# Patient Record
Sex: MALE | Race: WHITE | NOT HISPANIC OR LATINO | Employment: FULL TIME | URBAN - METROPOLITAN AREA
[De-identification: names, ages, dates, MRNs, and addresses within clinical notes are randomized per-mention and may not be internally consistent; named-entity substitution may affect disease eponyms.]

---

## 2023-11-12 ENCOUNTER — OFFICE VISIT (OUTPATIENT)
Dept: URGENT CARE | Facility: URGENT CARE | Age: 48
End: 2023-11-12
Payer: COMMERCIAL

## 2023-11-12 VITALS
DIASTOLIC BLOOD PRESSURE: 94 MMHG | TEMPERATURE: 97.1 F | RESPIRATION RATE: 15 BRPM | OXYGEN SATURATION: 99 % | WEIGHT: 184.8 LBS | HEART RATE: 61 BPM | SYSTOLIC BLOOD PRESSURE: 132 MMHG

## 2023-11-12 DIAGNOSIS — H57.89 REDNESS OF LEFT EYE: Primary | ICD-10-CM

## 2023-11-12 PROCEDURE — 99213 OFFICE O/P EST LOW 20 MIN: CPT | Performed by: FAMILY MEDICINE

## 2023-11-12 RX ORDER — POLYMYXIN B SULFATE AND TRIMETHOPRIM 1; 10000 MG/ML; [USP'U]/ML
1-2 SOLUTION OPHTHALMIC EVERY 4 HOURS
Qty: 10 ML | Refills: 0 | Status: SHIPPED | OUTPATIENT
Start: 2023-11-12 | End: 2024-04-19

## 2023-11-12 NOTE — PROGRESS NOTES
Assessment & Plan     Redness of left eye  Differentials discussed in detail.  Physical examination consistent with tiny left corneal abrasion with conjunctival injection.  Polytrim pulm drops prescribed and suggested cool compresses, regular eye wash.  Instructed to avoid using contact lenses and follow-up with ophthalmology as well.  All questions answered.  - trimethoprim-polymyxin b (POLYTRIM) 29004-2.1 UNIT/ML-% ophthalmic solution; Place 1-2 drops Into the left eye every 4 hours      Los Reynoso MD  [facility] [place]    Anjana Tovar is a 48 year old, presenting for the following health issues:  Eye Problem:    Lt eye redness, discharge and watery. Sensitive to light. Sx started yesterday, pt thinks possibly scratched.  Spinal surgeon by profession.      Review of Systems   Constitutional, HEENT, cardiovascular, pulmonary, gi and gu systems are negative, except as otherwise noted.        Objective    BP (!) 132/94 (BP Location: Right arm, Cuff Size: Adult Regular)   Pulse 61   Temp 97.1  F (36.2  C) (Tympanic)   Resp 15   Wt 83.8 kg (184 lb 12.8 oz)   SpO2 99%   There is no height or weight on file to calculate BMI.  Physical Exam   GENERAL: alert and no distress  EYES: PERRL, EOMI, conjunctiva/corneas- yellow colored discharge present left, no foreign body visualized, fluorescein test positive for tiny corneal abrasion  RESP: no wheezes  MS: no gross musculoskeletal defects noted, no edema  NEURO: Normal strength and tone, mentation intact and speech normal  PSYCH: mentation appears normal, affect normal/bright

## 2024-01-03 ENCOUNTER — NURSE TRIAGE (OUTPATIENT)
Dept: NURSING | Facility: CLINIC | Age: 49
End: 2024-01-03
Payer: COMMERCIAL

## 2024-01-03 NOTE — TELEPHONE ENCOUNTER
Nurse Triage SBAR    Is this a 2nd Level Triage? No    Situation/Background: Patient is calling with concern of chest pains which patient feel is related to stress. Patient states he needs to establish care with a Provider as he is new to the area.     Assessment:   Patient reports constant pressure in mid sternum X 6 months  Stress (thinking about work) worsens the pressure  Patient states he has not been getting decent sleep    Recommendation: Per disposition, Call  now. Patient declines, stating he has had the chest pressure for months. Patient states he has an appointment to establish care on 1/12/24. Reviewed rationale for disposition with patient and verbalizes understanding. Declines additional questions. Advised patient to call back with any new or worsening symptoms. Patient verbalized understanding and agrees with plan.    Protocol Recommended Disposition: Call 911    Reyna Garay RN on 1/3/2024 at 3:35 PM  [facility]  Reason for Disposition   Chest pain lasting longer than 5 minutes and ANY of the following:    history of heart disease  (i.e., heart attack, bypass surgery, angina, angioplasty, CHF; not just a heart murmur)    described as crushing, pressure-like, or heavy    age > 50    age > 30 AND at least one cardiac risk factor (i.e., hypertension, diabetes, obesity, smoker or strong family history of heart disease)    not relieved with nitroglycerin    Additional Information   Negative: SEVERE difficulty breathing (e.g., struggling for each breath, speaks in single words)   Negative: Difficult to awaken or acting confused (e.g., disoriented, slurred speech)   Negative: Shock suspected (e.g., cold/pale/clammy skin, too weak to stand, low BP, rapid pulse)   Negative: Passed out (i.e., lost consciousness, collapsed and was not responding)    Protocols used: Chest Pain-A-

## 2024-01-12 ENCOUNTER — ANCILLARY PROCEDURE (OUTPATIENT)
Dept: GENERAL RADIOLOGY | Facility: CLINIC | Age: 49
End: 2024-01-12
Attending: STUDENT IN AN ORGANIZED HEALTH CARE EDUCATION/TRAINING PROGRAM
Payer: COMMERCIAL

## 2024-01-12 ENCOUNTER — OFFICE VISIT (OUTPATIENT)
Dept: FAMILY MEDICINE | Facility: CLINIC | Age: 49
End: 2024-01-12
Payer: COMMERCIAL

## 2024-01-12 VITALS
WEIGHT: 186 LBS | DIASTOLIC BLOOD PRESSURE: 86 MMHG | BODY MASS INDEX: 26.63 KG/M2 | HEIGHT: 70 IN | HEART RATE: 72 BPM | RESPIRATION RATE: 16 BRPM | SYSTOLIC BLOOD PRESSURE: 130 MMHG | TEMPERATURE: 98.2 F | OXYGEN SATURATION: 98 %

## 2024-01-12 DIAGNOSIS — Z12.11 SCREEN FOR COLON CANCER: ICD-10-CM

## 2024-01-12 DIAGNOSIS — M25.512 ACUTE PAIN OF BOTH SHOULDERS: ICD-10-CM

## 2024-01-12 DIAGNOSIS — Z13.220 SCREENING FOR HYPERLIPIDEMIA: ICD-10-CM

## 2024-01-12 DIAGNOSIS — Z11.59 NEED FOR HEPATITIS C SCREENING TEST: ICD-10-CM

## 2024-01-12 DIAGNOSIS — M25.511 ACUTE PAIN OF BOTH SHOULDERS: ICD-10-CM

## 2024-01-12 DIAGNOSIS — Z11.4 SCREENING FOR HIV (HUMAN IMMUNODEFICIENCY VIRUS): ICD-10-CM

## 2024-01-12 DIAGNOSIS — Z13.29 SCREENING FOR THYROID DISORDER: ICD-10-CM

## 2024-01-12 DIAGNOSIS — R07.89 OTHER CHEST PAIN: ICD-10-CM

## 2024-01-12 DIAGNOSIS — F41.1 GENERALIZED ANXIETY DISORDER: ICD-10-CM

## 2024-01-12 DIAGNOSIS — R07.89 OTHER CHEST PAIN: Primary | ICD-10-CM

## 2024-01-12 LAB
ERYTHROCYTE [DISTWIDTH] IN BLOOD BY AUTOMATED COUNT: 11.9 % (ref 10–15)
HCT VFR BLD AUTO: 45.6 % (ref 40–53)
HCV AB SERPL QL IA: NONREACTIVE
HGB BLD-MCNC: 15.6 G/DL (ref 13.3–17.7)
MCH RBC QN AUTO: 33.1 PG (ref 26.5–33)
MCHC RBC AUTO-ENTMCNC: 34.2 G/DL (ref 31.5–36.5)
MCV RBC AUTO: 97 FL (ref 78–100)
PLATELET # BLD AUTO: 201 10E3/UL (ref 150–450)
RBC # BLD AUTO: 4.71 10E6/UL (ref 4.4–5.9)
WBC # BLD AUTO: 4.4 10E3/UL (ref 4–11)

## 2024-01-12 PROCEDURE — 84484 ASSAY OF TROPONIN QUANT: CPT | Performed by: STUDENT IN AN ORGANIZED HEALTH CARE EDUCATION/TRAINING PROGRAM

## 2024-01-12 PROCEDURE — 87389 HIV-1 AG W/HIV-1&-2 AB AG IA: CPT | Performed by: STUDENT IN AN ORGANIZED HEALTH CARE EDUCATION/TRAINING PROGRAM

## 2024-01-12 PROCEDURE — 86803 HEPATITIS C AB TEST: CPT | Performed by: STUDENT IN AN ORGANIZED HEALTH CARE EDUCATION/TRAINING PROGRAM

## 2024-01-12 PROCEDURE — 85027 COMPLETE CBC AUTOMATED: CPT | Performed by: STUDENT IN AN ORGANIZED HEALTH CARE EDUCATION/TRAINING PROGRAM

## 2024-01-12 PROCEDURE — 99214 OFFICE O/P EST MOD 30 MIN: CPT | Performed by: STUDENT IN AN ORGANIZED HEALTH CARE EDUCATION/TRAINING PROGRAM

## 2024-01-12 PROCEDURE — 36415 COLL VENOUS BLD VENIPUNCTURE: CPT | Performed by: STUDENT IN AN ORGANIZED HEALTH CARE EDUCATION/TRAINING PROGRAM

## 2024-01-12 PROCEDURE — 80061 LIPID PANEL: CPT | Performed by: STUDENT IN AN ORGANIZED HEALTH CARE EDUCATION/TRAINING PROGRAM

## 2024-01-12 PROCEDURE — 84443 ASSAY THYROID STIM HORMONE: CPT | Performed by: STUDENT IN AN ORGANIZED HEALTH CARE EDUCATION/TRAINING PROGRAM

## 2024-01-12 PROCEDURE — 93000 ELECTROCARDIOGRAM COMPLETE: CPT | Performed by: STUDENT IN AN ORGANIZED HEALTH CARE EDUCATION/TRAINING PROGRAM

## 2024-01-12 PROCEDURE — 71046 X-RAY EXAM CHEST 2 VIEWS: CPT | Mod: TC | Performed by: RADIOLOGY

## 2024-01-12 PROCEDURE — 80053 COMPREHEN METABOLIC PANEL: CPT | Performed by: STUDENT IN AN ORGANIZED HEALTH CARE EDUCATION/TRAINING PROGRAM

## 2024-01-12 PROCEDURE — 73030 X-RAY EXAM OF SHOULDER: CPT | Mod: TC | Performed by: RADIOLOGY

## 2024-01-12 RX ORDER — FLUOXETINE 10 MG/1
10 CAPSULE ORAL DAILY
Qty: 7 CAPSULE | Refills: 0 | Status: SHIPPED | OUTPATIENT
Start: 2024-01-12 | End: 2024-04-19

## 2024-01-12 ASSESSMENT — ANXIETY QUESTIONNAIRES
GAD7 TOTAL SCORE: 10
1. FEELING NERVOUS, ANXIOUS, OR ON EDGE: MORE THAN HALF THE DAYS
5. BEING SO RESTLESS THAT IT IS HARD TO SIT STILL: NOT AT ALL
3. WORRYING TOO MUCH ABOUT DIFFERENT THINGS: MORE THAN HALF THE DAYS
GAD7 TOTAL SCORE: 10
7. FEELING AFRAID AS IF SOMETHING AWFUL MIGHT HAPPEN: NOT AT ALL
6. BECOMING EASILY ANNOYED OR IRRITABLE: MORE THAN HALF THE DAYS
2. NOT BEING ABLE TO STOP OR CONTROL WORRYING: MORE THAN HALF THE DAYS
IF YOU CHECKED OFF ANY PROBLEMS ON THIS QUESTIONNAIRE, HOW DIFFICULT HAVE THESE PROBLEMS MADE IT FOR YOU TO DO YOUR WORK, TAKE CARE OF THINGS AT HOME, OR GET ALONG WITH OTHER PEOPLE: NOT DIFFICULT AT ALL

## 2024-01-12 ASSESSMENT — PATIENT HEALTH QUESTIONNAIRE - PHQ9: 5. POOR APPETITE OR OVEREATING: MORE THAN HALF THE DAYS

## 2024-01-12 NOTE — LETTER
"January 17, 2024      Guy WILLIE Nino  [address] [place] [id number]        Dear ,    We are writing to inform you of your test results.    You are negative for hepatitis C and HIV. Your troponin, thyroid, kidney function, liver function and electrolytes are normal. You do not have anemia.     The information below is a cardiac risk score and helps determine which patients would benefit from cholesterol lowering medication.        You have slightly elevated cholesterol. I recommend diet and exercise  to help lower your cholesterol as high cholesterol can increase your risk for heart disease.     You can reduce your total and LDL cholesterol levels by eating less saturated fats. Eating a diet high in fiber can help improve your total cholesterol and LDL or 'bad' cholesterol levels. Try to eat more servings of fruits and vegetables. Eat more high-fiber foods like whole-grains and nuts. Try to eat more fish.  Eat less fatty, marbled meats and try to avoid fried foods if possible too. Doing all this will result in improved cholesterol numbers and, more importantly, better health. Reducing carbohydrates and fats in your diet, losing weight and consuming less alcohol can improve your triglyceride levels.  Increasing exercise can improve HDL ( \"good cholesterol\") levels. A good target to shoot for is 30 minutes of aerobic activity at least 5 days per week. We should recheck your cholesterol in one year's time.       The 10-year ASCVD risk score (Violeta NOEL, et al., 2019) is: 3.6%    Values used to calculate the score:      Age: 48 years      Sex: Male      Is Non- : No      Diabetic: No      Tobacco smoker: No      Systolic Blood Pressure: 130 mmHg      Is BP treated: No      HDL Cholesterol: 51 mg/dL      Total Cholesterol: 236 mg/dL    Resulted Orders   HIV Antigen Antibody Combo   Result Value Ref Range    HIV Antigen Antibody Combo Nonreactive Nonreactive      Comment:      Negative " HIV-1/-2 antigen and antibody screening test results usually indicate the absence of HIV-1 and HIV-2 infection. However, such negative results do not rule-out acute HIV infection.  If acute HIV-1 or HIV-2 infection is suspected, detection of HIV-1 or HIV-2 RNA  is recommended.    Hepatitis C Screen Reflex to HCV RNA Quant and Genotype   Result Value Ref Range    Hepatitis C Antibody Nonreactive Nonreactive      Comment:      A nonreactive screening test result does not exclude the possibility of exposure to or infection with HCV. Nonreactive screening test results in individuals with prior exposure to HCV may be due to antibody levels below the limit of detection of this assay or lack of reactivity to the HCV antigens used in this assay. Patients with recent HCV infections (<3 months from time of exposure) may have false-negative HCV antibody results due to the time needed for seroconversion (average of 8 to 9 weeks).   Lipid panel reflex to direct LDL Non-fasting   Result Value Ref Range    Cholesterol 236 (H) <200 mg/dL    Triglycerides 228 (H) <150 mg/dL    Direct Measure HDL 51 >=40 mg/dL    LDL Cholesterol Calculated 139 (H) <=100 mg/dL    Non HDL Cholesterol 185 (H) <130 mg/dL    Patient Fasting > 8hrs? No     Narrative    Cholesterol  Desirable:  <200 mg/dL    Triglycerides  Normal:  Less than 150 mg/dL  Borderline High:  150-199 mg/dL  High:  200-499 mg/dL  Very High:  Greater than or equal to 500 mg/dL    Direct Measure HDL  Female:  Greater than or equal to 50 mg/dL   Male:  Greater than or equal to 40 mg/dL    LDL Cholesterol  Desirable:  <100mg/dL  Above Desirable:  100-129 mg/dL   Borderline High:  130-159 mg/dL   High:  160-189 mg/dL   Very High:  >= 190 mg/dL    Non HDL Cholesterol  Desirable:  130 mg/dL  Above Desirable:  130-159 mg/dL  Borderline High:  160-189 mg/dL  High:  190-219 mg/dL  Very High:  Greater than or equal to 220 mg/dL   Comprehensive metabolic panel (BMP + Alb, Alk Phos, ALT, AST,  Total. Bili, TP)   Result Value Ref Range    Sodium 140 135 - 145 mmol/L      Comment:      Reference intervals for this test were updated on 09/26/2023 to more accurately reflect our healthy population. There may be differences in the flagging of prior results with similar values performed with this method. Interpretation of those prior results can be made in the context of the updated reference intervals.     Potassium 4.2 3.4 - 5.3 mmol/L    Carbon Dioxide (CO2) 28 22 - 29 mmol/L    Anion Gap 10 7 - 15 mmol/L    Urea Nitrogen 17.8 6.0 - 20.0 mg/dL    Creatinine 1.03 0.67 - 1.17 mg/dL    GFR Estimate 90 >60 mL/min/1.73m2    Calcium 9.6 8.6 - 10.0 mg/dL    Chloride 102 98 - 107 mmol/L    Glucose 75 70 - 99 mg/dL    Alkaline Phosphatase 93 40 - 150 U/L      Comment:      Reference intervals for this test were updated on 11/14/2023 to more accurately reflect our healthy population. There may be differences in the flagging of prior results with similar values performed with this method. Interpretation of those prior results can be made in the context of the updated reference intervals.    AST 35 0 - 45 U/L      Comment:      Reference intervals for this test were updated on 6/12/2023 to more accurately reflect our healthy population. There may be differences in the flagging of prior results with similar values performed with this method. Interpretation of those prior results can be made in the context of the updated reference intervals.    ALT 34 0 - 70 U/L      Comment:      Reference intervals for this test were updated on 6/12/2023 to more accurately reflect our healthy population. There may be differences in the flagging of prior results with similar values performed with this method. Interpretation of those prior results can be made in the context of the updated reference intervals.      Protein Total 7.4 6.4 - 8.3 g/dL    Albumin 4.7 3.5 - 5.2 g/dL    Bilirubin Total 0.7 <=1.2 mg/dL   CBC with platelets   Result  Value Ref Range    WBC Count 4.4 4.0 - 11.0 10e3/uL    RBC Count 4.71 4.40 - 5.90 10e6/uL    Hemoglobin 15.6 13.3 - 17.7 g/dL    Hematocrit 45.6 40.0 - 53.0 %    MCV 97 78 - 100 fL    MCH 33.1 (H) 26.5 - 33.0 pg    MCHC 34.2 31.5 - 36.5 g/dL    RDW 11.9 10.0 - 15.0 %    Platelet Count 201 150 - 450 10e3/uL   Troponin T, High Sensitivity   Result Value Ref Range    Troponin T, High Sensitivity <6 <=22 ng/L      Comment:      Either a High Sensitivity Troponin T baseline (0 hours) value = 100 ng/L, or an increase in High Sensitivity Troponin T = 7 ng/L at 2 hours compared to 0 hours (2-0 hours), suggests myocardial injury, and urgent clinical attention is required.    If the 2-0 hours increase is <7 ng/L, a High Sensitivity Troponin T result above gender-specific reference ranges warrants further evaluation.   Recommendations for further evaluation include correlation with clinical decision-making tool (e.g., HEART), a 3rd High Sensitivity Troponin T test 2 hours after the 2nd (a 20% change from baseline would represent concern), admission for observation, close PCC/cardiology follow-up, or urgent outpatient provocative testing.   TSH with free T4 reflex   Result Value Ref Range    TSH 1.15 0.30 - 4.20 uIU/mL       If you have any questions or concerns, please call the clinic at the number listed above.       Sincerely,      Nidia Mitchell MD, MPH

## 2024-01-12 NOTE — PROGRESS NOTES
Assessment & Plan     1. Other chest pain    - EKG 12-lead complete w/read - Clinics  - Comprehensive metabolic panel (BMP + Alb, Alk Phos, ALT, AST, Total. Bili, TP); Future  - CBC with platelets; Future  - Troponin T, High Sensitivity; Future  - TSH with free T4 reflex; Future  - Echocardiogram Exercise Stress; Future  -CXR  MI was concerned about this patient's  chest pain and considered multiple causes including but not limited to the following.     ACS: EKG does not show acute ischemic changes and cardiac enzyme is pending. Low suspicion for ACS but given history , will order stress echo.  PE:  Wells Criteria is low risk. Low suspicion given this and pain is intermittent.  Aortic Dissection: The onset of pain was neither sudden nor severe, no history of poorly controlled high blood pressure and radial / pedal pulses are symmetrical. No marfanoid features are present. In addition, symptoms are intermittent.   Lab and CXR pending.       IMPRESSION: Based on this patient's history, exam, and diagnostic results, his chest pain is most likely anxiety related given HPI.    Complete history and physical exam as below. AF with normal VS.     DDx and Dx discussed with and explained to the pt to their satisfaction.  All questions were answered at this time. Pt expressed understanding of and agreement with this dx, tx, and plan. I have given the patient a list of pertinent indications for re-evaluation. Will go to the Emergency Department if symptoms worsen or new concerning symptoms arise.   2. Generalized anxiety disorder    Start Prozac 10 mg daily for 1 week then 20 mg daily thereafter.    I discussed the potential side effects of antianxiety medications as well as the liklihood of worsening before improvement over the next few weeks. I reemphasized the importance of a multi-armed approach towards the treatment of anxiety .In addition, I emphasized the importance of ongoing relaxation. If new or worsening symptoms,  "he will seek help immediately.    3. Acute pain of both shoulders  Tendon etiology vs arthritis .Recommend RICE and tylenol.   - XR Shoulder Left G/E 3 Views; Future  - XR Shoulder Right G/E 3 Views; Future    4. Screening for thyroid disorder  tsh    5. Screening for hyperlipidemia    - Lipid panel reflex to direct LDL Non-fasting; Future    6. Screening for HIV (human immunodeficiency virus)    - HIV Antigen Antibody Combo; Future    7. Need for hepatitis C screening test    - Hepatitis C Screen Reflex to HCV RNA Quant and Genotype; Future    8. Screen for colon cancer    - Colonoscopy Screening  Referral; Future           Patient declined vaccines.      Nidia Mitchell MD  [facility] LULY Tovar is a 48 year old, presenting for the following health issues:  Chest Pain      1/12/2024     1:50 PM   Additional Questions   Roomed by Jeana BARRIOS         1/12/2024     1:50 PM   Patient Reported Additional Medications   Patient reports taking the following new medications No new medications to add       Chest Pain     History of Present Illness       Reason for visit:  Chest and shoulder  Symptom onset:  More than a month (6-8 months)  Symptoms include:  Chest pain,arm shoulder pain  Symptom intensity:  Mild  Symptom progression:  Staying the same  Had these symptoms before:  Yes (saw a physician in [place] for this, but they didn't help)  Has tried/received treatment for these symptoms:  No  Prior treatment description:  None  What makes it worse:  Feels like work stress works on the stress  What makes it better:  \"fishing\", when he's not stressed    He eats 0-1 servings of fruits and vegetables daily.He consumes 1 sweetened beverage(s) daily.He exercises with enough effort to increase his heart rate 60 or more minutes per day.  He exercises with enough effort to increase his heart rate 5 days per week.   He is taking medications regularly.     Patient is a spine " "representative for spine surgery. He thinks it is work related as his job entails lifting and arranging equipment.   Pressure in chest that is constant,. It s sometimes worse when thinking about work. It is better with fishing and haunting, reason why he thinks it is work related. [place].    He has been having problem maintaining erection. He has a new girlfriend that he is emotionally and physically attracted to. He did not have any issues with erection a year ago. He is not interested in medication at this time.    He has noticed shoulder pain that radiates down his arm. It started about a week ago. It occurs when his shoulder is externally rotated and and elevated. NO injuring but is work entails repetitive motion.    He will let me  know if he decides to start medication for ED.    Review of Systems   Cardiovascular:  Positive for chest pain.            Objective    /86 (BP Location: Right arm, Patient Position: Sitting, Cuff Size: Adult Regular)   Pulse 72   Temp 98.2  F (36.8  C) (Oral)   Resp 16   Ht 1.778 m (5' 10\")   Wt 84.4 kg (186 lb)   SpO2 98%   BMI 26.69 kg/m    Body mass index is 26.69 kg/m .  Physical Exam   GENERAL: healthy, alert and no distress  RESP: lungs clear to auscultation - no rales, rhonchi or wheezes  CV: regular rate and rhythm, normal S1 S2, no S3 or S4, no murmur, click or rub,  MS: no gross musculoskeletal defects noted, no edema  MS: normal muscle tone, normal range of motion, and no cyanosis, clubbing, or edema  PSYCH: mentation appears normal, affect normal/bright                  "

## 2024-01-12 NOTE — PATIENT INSTRUCTIONS
Maxx Tovar,    Thank you for allowing [facility] to manage your care.    I ordered some blood work, please go to the laboratory to get your laboratory studies.      I sent your prescriptions to your pharmacy.    I ordered a stress test , please call diagnostic imaging [phone] to schedule your test.      For your convenience, test results are released as soon as they are available  Please allow 1-2 business days for me to send you a comment about your results.  If not done so, I encourage you to login into [employer] [email or web address] to review your results in real time.     If you have any questions or concerns, please feel free to call us at [phone].    Sincerely,    Dr. Mitchell    Did you know?      You can schedule a video visit for follow-up appointments as well as future appointments for certain conditions.  Please see the below link.     [email or web address]    If you have not already done so,  I encourage you to sign up for [employer] ([email or web address]).  This will allow you to review your results, securely communicate with a provider, and schedule virtual visits as well.

## 2024-01-12 NOTE — LETTER
"January 17, 2024      Guy WILLIE Nino  [address] [facility] [place] [address]      Guy,     You are negative for hepatitis C and HIV.  Your troponin, thyroid, kidney function, liver function and electrolytes are normal.  You do not have anemia.     The information below is a cardiac risk score and helps determine which patients would benefit from cholesterol lowering medication.       You have slightly elevated cholesterol. I recommend diet and exercise  to help lower your cholesterol as high cholesterol can increase your risk for heart disease.     You can reduce your total and LDL cholesterol levels by eating less saturated fats.  Eating a diet high in fiber can help improve your total cholesterol and LDL or 'bad' cholesterol levels.  Try to eat more servings of fruits and vegetables.  Eat more high-fiber foods like whole-grains and nuts.  Try to eat more fish.  Eat less fatty, marbled meats and try to avoid fried foods if possible too.  Doing all this will result in improved cholesterol numbers and, more importantly, better health.  Reducing carbohydrates and fats in your diet, losing weight and consuming less alcohol can improve your triglyceride levels.  Increasing exercise can improve HDL ( \"good cholesterol\") levels.  A good target to shoot for is 30 minutes of aerobic activity at least 5 days per week.  We should recheck your cholesterol in one year's time.      The 10-year ASCVD risk score (Violeta DK, et al., 2019) is: 3.6%     Values used to calculate the score:       Age: 48 years       Sex: Male       Is Non- : No       Diabetic: No       Tobacco smoker: No       Systolic Blood Pressure: 130 mmHg       Is BP treated: No       HDL Cholesterol: 51 mg/dL       Total Cholesterol: 236 mg/dL     Please call me with any questions or concerns.     Resulted Orders   HIV Antigen Antibody Combo   Result Value Ref Range    HIV Antigen Antibody Combo Nonreactive Nonreactive      Comment:      " Negative HIV-1/-2 antigen and antibody screening test results usually indicate the absence of HIV-1 and HIV-2 infection. However, such negative results do not rule-out acute HIV infection.  If acute HIV-1 or HIV-2 infection is suspected, detection of HIV-1 or HIV-2 RNA  is recommended.    Hepatitis C Screen Reflex to HCV RNA Quant and Genotype   Result Value Ref Range    Hepatitis C Antibody Nonreactive Nonreactive      Comment:      A nonreactive screening test result does not exclude the possibility of exposure to or infection with HCV. Nonreactive screening test results in individuals with prior exposure to HCV may be due to antibody levels below the limit of detection of this assay or lack of reactivity to the HCV antigens used in this assay. Patients with recent HCV infections (<3 months from time of exposure) may have false-negative HCV antibody results due to the time needed for seroconversion (average of 8 to 9 weeks).   Lipid panel reflex to direct LDL Non-fasting   Result Value Ref Range    Cholesterol 236 (H) <200 mg/dL    Triglycerides 228 (H) <150 mg/dL    Direct Measure HDL 51 >=40 mg/dL    LDL Cholesterol Calculated 139 (H) <=100 mg/dL    Non HDL Cholesterol 185 (H) <130 mg/dL    Patient Fasting > 8hrs? No     Narrative    Cholesterol  Desirable:  <200 mg/dL    Triglycerides  Normal:  Less than 150 mg/dL  Borderline High:  150-199 mg/dL  High:  200-499 mg/dL  Very High:  Greater than or equal to 500 mg/dL    Direct Measure HDL  Female:  Greater than or equal to 50 mg/dL   Male:  Greater than or equal to 40 mg/dL    LDL Cholesterol  Desirable:  <100mg/dL  Above Desirable:  100-129 mg/dL   Borderline High:  130-159 mg/dL   High:  160-189 mg/dL   Very High:  >= 190 mg/dL    Non HDL Cholesterol  Desirable:  130 mg/dL  Above Desirable:  130-159 mg/dL  Borderline High:  160-189 mg/dL  High:  190-219 mg/dL  Very High:  Greater than or equal to 220 mg/dL   Comprehensive metabolic panel (BMP + Alb, Alk Phos,  ALT, AST, Total. Bili, TP)   Result Value Ref Range    Sodium 140 135 - 145 mmol/L      Comment:      Reference intervals for this test were updated on 09/26/2023 to more accurately reflect our healthy population. There may be differences in the flagging of prior results with similar values performed with this method. Interpretation of those prior results can be made in the context of the updated reference intervals.     Potassium 4.2 3.4 - 5.3 mmol/L    Carbon Dioxide (CO2) 28 22 - 29 mmol/L    Anion Gap 10 7 - 15 mmol/L    Urea Nitrogen 17.8 6.0 - 20.0 mg/dL    Creatinine 1.03 0.67 - 1.17 mg/dL    GFR Estimate 90 >60 mL/min/1.73m2    Calcium 9.6 8.6 - 10.0 mg/dL    Chloride 102 98 - 107 mmol/L    Glucose 75 70 - 99 mg/dL    Alkaline Phosphatase 93 40 - 150 U/L      Comment:      Reference intervals for this test were updated on 11/14/2023 to more accurately reflect our healthy population. There may be differences in the flagging of prior results with similar values performed with this method. Interpretation of those prior results can be made in the context of the updated reference intervals.    AST 35 0 - 45 U/L      Comment:      Reference intervals for this test were updated on 6/12/2023 to more accurately reflect our healthy population. There may be differences in the flagging of prior results with similar values performed with this method. Interpretation of those prior results can be made in the context of the updated reference intervals.    ALT 34 0 - 70 U/L      Comment:      Reference intervals for this test were updated on 6/12/2023 to more accurately reflect our healthy population. There may be differences in the flagging of prior results with similar values performed with this method. Interpretation of those prior results can be made in the context of the updated reference intervals.      Protein Total 7.4 6.4 - 8.3 g/dL    Albumin 4.7 3.5 - 5.2 g/dL    Bilirubin Total 0.7 <=1.2 mg/dL   CBC with  platelets   Result Value Ref Range    WBC Count 4.4 4.0 - 11.0 10e3/uL    RBC Count 4.71 4.40 - 5.90 10e6/uL    Hemoglobin 15.6 13.3 - 17.7 g/dL    Hematocrit 45.6 40.0 - 53.0 %    MCV 97 78 - 100 fL    MCH 33.1 (H) 26.5 - 33.0 pg    MCHC 34.2 31.5 - 36.5 g/dL    RDW 11.9 10.0 - 15.0 %    Platelet Count 201 150 - 450 10e3/uL   Troponin T, High Sensitivity   Result Value Ref Range    Troponin T, High Sensitivity <6 <=22 ng/L      Comment:      Either a High Sensitivity Troponin T baseline (0 hours) value = 100 ng/L, or an increase in High Sensitivity Troponin T = 7 ng/L at 2 hours compared to 0 hours (2-0 hours), suggests myocardial injury, and urgent clinical attention is required.    If the 2-0 hours increase is <7 ng/L, a High Sensitivity Troponin T result above gender-specific reference ranges warrants further evaluation.   Recommendations for further evaluation include correlation with clinical decision-making tool (e.g., HEART), a 3rd High Sensitivity Troponin T test 2 hours after the 2nd (a 20% change from baseline would represent concern), admission for observation, close PCC/cardiology follow-up, or urgent outpatient provocative testing.   TSH with free T4 reflex   Result Value Ref Range    TSH 1.15 0.30 - 4.20 uIU/mL       If you have any questions or concerns, please call the clinic at the number listed above.       Sincerely,      Nidia Mitchell MD

## 2024-01-13 LAB
ALBUMIN SERPL BCG-MCNC: 4.7 G/DL (ref 3.5–5.2)
ALP SERPL-CCNC: 93 U/L (ref 40–150)
ALT SERPL W P-5'-P-CCNC: 34 U/L (ref 0–70)
ANION GAP SERPL CALCULATED.3IONS-SCNC: 10 MMOL/L (ref 7–15)
AST SERPL W P-5'-P-CCNC: 35 U/L (ref 0–45)
BILIRUB SERPL-MCNC: 0.7 MG/DL
BUN SERPL-MCNC: 17.8 MG/DL (ref 6–20)
CALCIUM SERPL-MCNC: 9.6 MG/DL (ref 8.6–10)
CHLORIDE SERPL-SCNC: 102 MMOL/L (ref 98–107)
CHOLEST SERPL-MCNC: 236 MG/DL
CREAT SERPL-MCNC: 1.03 MG/DL (ref 0.67–1.17)
DEPRECATED HCO3 PLAS-SCNC: 28 MMOL/L (ref 22–29)
EGFRCR SERPLBLD CKD-EPI 2021: 90 ML/MIN/1.73M2
FASTING STATUS PATIENT QL REPORTED: NO
GLUCOSE SERPL-MCNC: 75 MG/DL (ref 70–99)
HDLC SERPL-MCNC: 51 MG/DL
HIV 1+2 AB+HIV1 P24 AG SERPL QL IA: NONREACTIVE
LDLC SERPL CALC-MCNC: 139 MG/DL
NONHDLC SERPL-MCNC: 185 MG/DL
POTASSIUM SERPL-SCNC: 4.2 MMOL/L (ref 3.4–5.3)
PROT SERPL-MCNC: 7.4 G/DL (ref 6.4–8.3)
SODIUM SERPL-SCNC: 140 MMOL/L (ref 135–145)
TRIGL SERPL-MCNC: 228 MG/DL
TROPONIN T SERPL HS-MCNC: <6 NG/L
TSH SERPL DL<=0.005 MIU/L-ACNC: 1.15 UIU/ML (ref 0.3–4.2)

## 2024-04-15 ENCOUNTER — TELEPHONE (OUTPATIENT)
Dept: FAMILY MEDICINE | Facility: CLINIC | Age: 49
End: 2024-04-15
Payer: COMMERCIAL

## 2024-04-15 NOTE — CONFIDENTIAL NOTE
Could you please use any available time slot to set up a face-to-face appointment and update the patient?  Thank you.

## 2024-04-15 NOTE — TELEPHONE ENCOUNTER
Reason for Call:  Appointment Request    Patient requesting this type of appt:  Office visit    Requested provider:  [facility]    Reason patient unable to be scheduled:  Not accepting new pt. Pt was re ferred by friends to [facility]    When does patient want to be seen/preferred time:  Whenever the soonest appt is he can get in. Pt has been having chest pain and saw one provider and wants another opinion    Comments: n/a    Okay to leave a detailed message?: Yes at Cell number on file:    No relevant phone numbers on file.       Call taken on 4/15/2024 at 12:02 PM by Mariia Tobar

## 2024-04-19 ENCOUNTER — OFFICE VISIT (OUTPATIENT)
Dept: FAMILY MEDICINE | Facility: CLINIC | Age: 49
End: 2024-04-19
Payer: COMMERCIAL

## 2024-04-19 VITALS
SYSTOLIC BLOOD PRESSURE: 122 MMHG | HEIGHT: 70 IN | BODY MASS INDEX: 26.57 KG/M2 | HEART RATE: 82 BPM | WEIGHT: 185.6 LBS | TEMPERATURE: 97.9 F | RESPIRATION RATE: 18 BRPM | DIASTOLIC BLOOD PRESSURE: 76 MMHG | OXYGEN SATURATION: 96 %

## 2024-04-19 DIAGNOSIS — R07.89 CHEST PRESSURE: Primary | ICD-10-CM

## 2024-04-19 DIAGNOSIS — F41.1 GENERALIZED ANXIETY DISORDER: ICD-10-CM

## 2024-04-19 PROCEDURE — 99214 OFFICE O/P EST MOD 30 MIN: CPT | Performed by: FAMILY MEDICINE

## 2024-04-19 ASSESSMENT — PAIN SCALES - GENERAL: PAINLEVEL: MILD PAIN (2)

## 2024-04-19 NOTE — PROGRESS NOTES
Assessment & Plan     (R07.89) Chest pressure  (primary encounter diagnosis)  Comment: Long discussion with the patient regarding current symptoms.  He describes chest pressure, substernal.  Symptoms aggravated by work stress and at times exertion.  No associated nausea, diaphoresis, jaw pain or left arm pain.  He works out on a daily basis, no change in exercise endurance.  No prior cardiac history.  Non-smoker, no history of hypertension, diabetes, no history of premature cardiac events.  Patient was scheduled for a stress echocardiogram, but has not completed this.  Reviewed options, medically appears stable, will pursue outpatient workup.  Advise outpatient coronary CT angiogram to see if he has significant calcium burden.  Reviewed that this test is helpful if it is normal, or high, intermediate results can be difficult to interpret.  Further workup will be determined by upcoming testing symptoms.  He may need some type of stress testing if symptoms persist or worsen.  Plan: CT Angiogram coronary artery        Patient was comfortable with this plan.  Advised to watch carefully for progression of symptoms, worsening or new symptoms.  If recurs he is to contact her clinic, if serious seek emergency room services.    (F41.1) Generalized anxiety disorder  Comment: No response to SSRI therapy, did note significant sexual side effects.  No longer on fluoxetine.  Plan: For now, will monitor, hold on specific medications.  Hopefully, reassurance and nonpharmacological treatment of anxiety will help.    Patient Instructions   I don't the chest pain isn't serious.     That said, the pressure sensation is concerning.     I'd recommend a CT coronary angiogram. They should contact you. If our scheduling center doesn't contact you, call [phone].     Stop the Prozac.     The sexual side effect will go away in about 6 weeks.     Follow up appointment in one month.           BMI  Estimated body mass index is 26.63  "kg/m  as calculated from the following:    Height as of this encounter: 1.778 m (5' 10\").    Weight as of this encounter: 84.2 kg (185 lb 9.6 oz).   Weight management plan: Discussed healthy diet and exercise guidelines          Anjana Tovar is a 49 year old, presenting for the following health issues:  Chest Pain (Same since last seen.)      4/19/2024     2:57 PM   Additional Questions   Roomed by Alondra/MA   Accompanied by Self         4/19/2024     2:57 PM   Patient Reported Additional Medications   Patient reports taking the following new medications N/A     History of Present Illness       Reason for visit:  General    He eats 0-1 servings of fruits and vegetables daily.He consumes 1 sweetened beverage(s) daily.He exercises with enough effort to increase his heart rate 20 to 29 minutes per day.  He exercises with enough effort to increase his heart rate 6 days per week.   He is taking medications regularly.           Review of Systems  CONSTITUTIONAL: NEGATIVE for fever, chills, change in weight  ENT/MOUTH: NEGATIVE for ear, mouth and throat problems  RESP: NEGATIVE for significant cough or SOB  CV: See above.      Objective    /76   Pulse 82   Temp 97.9  F (36.6  C) (Tympanic)   Resp 18   Ht 1.778 m (5' 10\")   Wt 84.2 kg (185 lb 9.6 oz)   SpO2 96%   BMI 26.63 kg/m    Body mass index is 26.63 kg/m .  Physical Exam   GENERAL: Healthy, alert and no distress  EYES: Eyes grossly normal to inspection, conjunctivae and sclerae normal  RESP: Lungs clear to auscultation - no rales, rhonchi or wheezes  CV: Regular rate and rhythm, normal S1 S2, no murmur  MS: No gross musculoskeletal defects noted, no edema  NEURO: Normal strength and tone, mentation intact and speech normal  PSYCH: Mentation appears normal, affect normal/bright           Signed Electronically by: Beti Garcia MD    "

## 2024-04-19 NOTE — PATIENT INSTRUCTIONS
I don't the chest pain isn't serious.     That said, the pressure sensation is concerning.     I'd recommend a CT coronary angiogram. They should contact you. If our scheduling center doesn't contact you, call [phone].     Stop the Prozac.     The sexual side effect will go away in about 6 weeks.     Follow up appointment in one month.

## 2024-08-12 ENCOUNTER — OFFICE VISIT (OUTPATIENT)
Dept: FAMILY MEDICINE | Facility: CLINIC | Age: 49
End: 2024-08-12
Payer: COMMERCIAL

## 2024-08-12 VITALS
HEIGHT: 70 IN | TEMPERATURE: 97.4 F | WEIGHT: 190.8 LBS | RESPIRATION RATE: 20 BRPM | DIASTOLIC BLOOD PRESSURE: 88 MMHG | BODY MASS INDEX: 27.32 KG/M2 | HEART RATE: 76 BPM | SYSTOLIC BLOOD PRESSURE: 124 MMHG | OXYGEN SATURATION: 96 %

## 2024-08-12 DIAGNOSIS — T63.461A WASP STING, ACCIDENTAL OR UNINTENTIONAL, INITIAL ENCOUNTER: Primary | ICD-10-CM

## 2024-08-12 DIAGNOSIS — M79.674 PAIN OF RIGHT GREAT TOE: ICD-10-CM

## 2024-08-12 LAB — URATE SERPL-MCNC: 5.8 MG/DL (ref 3.4–7)

## 2024-08-12 PROCEDURE — 84550 ASSAY OF BLOOD/URIC ACID: CPT | Performed by: PHYSICIAN ASSISTANT

## 2024-08-12 PROCEDURE — 36415 COLL VENOUS BLD VENIPUNCTURE: CPT | Performed by: PHYSICIAN ASSISTANT

## 2024-08-12 PROCEDURE — 99213 OFFICE O/P EST LOW 20 MIN: CPT | Performed by: PHYSICIAN ASSISTANT

## 2024-08-12 RX ORDER — CETIRIZINE HYDROCHLORIDE 10 MG/1
10 TABLET ORAL DAILY
Qty: 14 TABLET | Refills: 0 | Status: SHIPPED | OUTPATIENT
Start: 2024-08-12 | End: 2024-08-26

## 2024-08-12 RX ORDER — PREDNISONE 10 MG/1
TABLET ORAL
Qty: 21 TABLET | Refills: 0 | Status: SHIPPED | OUTPATIENT
Start: 2024-08-12 | End: 2024-08-21

## 2024-08-12 ASSESSMENT — ENCOUNTER SYMPTOMS
NUMBNESS: 0
WOUND: 1
COLOR CHANGE: 1
CHILLS: 0
DIAPHORESIS: 0
FEVER: 0

## 2024-08-12 ASSESSMENT — PAIN SCALES - GENERAL: PAINLEVEL: EXTREME PAIN (8)

## 2024-08-12 NOTE — PROGRESS NOTES
Assessment & Plan     Wasp sting, accidental or unintentional, initial encounter  Pain of right great toe  Patient is a 49-year-old male who presents to clinic due to wasp sting x 3 that occurring 5 days ago on dorsal aspect of right foot.  2 days after sting, patient developed redness, increased warmth, pain at right first MTP joint.  Patient has history of wasp stings without allergic reaction.  Vital signs normal.  Physical exam findings are concerning for either localized allergic reaction related to wasp sting or gout given joint pain away from actual sting location.  Lower suspicion for infection given timeline.  Will treat with Zyrtec as well as prednisone taper which will cover for localized allergic reaction as well as gout.  Will complete uric acid levels.  If symptoms do not improve with treatment plan over the next 48 hours, patient will reach out/follow-up for further recommendations/recheck.  Urgent follow-up precautions provided.  - predniSONE (DELTASONE) 10 MG tablet; Take 4 tablets (40 mg) by mouth daily for 3 days, THEN 2 tablets (20 mg) daily for 3 days, THEN 1 tablet (10 mg) daily for 3 days.  - cetirizine (ZYRTEC) 10 MG tablet; Take 1 tablet (10 mg) by mouth daily for 14 days  - Uric acid; Future  - Uric acid      See Patient Instructions    Anjana Tovar is a 49 year old, presenting for the following health issues:  Bee Sting  3 Bee stings on right foot. Happened last Wednesday and has not been seen for this yet, not sure if stinger is left in or not.       8/12/2024     9:03 AM   Additional Questions   Roomed by Jazmin SAUCEDO MA   Accompanied by No one         8/12/2024     9:03 AM   Patient Reported Additional Medications   Patient reports taking the following new medications None     History of Present Illness       Reason for visit:  Bee sting  Symptom onset:  3-7 days ago  Symptom intensity:  Mild  Symptom progression:  Staying the same  Had these symptoms before:  No    He eats 2-3  "servings of fruits and vegetables daily.He consumes 1 sweetened beverage(s) daily.He exercises with enough effort to increase his heart rate 20 to 29 minutes per day.  He exercises with enough effort to increase his heart rate 5 days per week.      5 days ago patient was stung on right foot 3 times when wasp was stuck in shoe  Patient has been stung multiple times in the past and has not had reaction  2 days afterwards patient developed pain, swelling, and redness which is staying the same   He is using Ibuprofen and Benadryl every 10 hours over the last few days.     No history of gout for patient.  Brother has history of gout.    Review of Systems   Constitutional:  Negative for chills, diaphoresis and fever.   Skin:  Positive for color change and wound. Negative for rash.   Neurological:  Negative for numbness.           Objective    /88   Pulse 76   Temp 97.4  F (36.3  C) (Temporal)   Resp 20   Ht 1.778 m (5' 10\")   Wt 86.5 kg (190 lb 12.8 oz)   SpO2 96%   BMI 27.38 kg/m    Body mass index is 27.38 kg/m .  Physical Exam  Vitals and nursing note reviewed.   Constitutional:       General: He is not in acute distress.     Appearance: Normal appearance.   HENT:      Head: Normocephalic and atraumatic.   Eyes:      Extraocular Movements: Extraocular movements intact.      Pupils: Pupils are equal, round, and reactive to light.   Cardiovascular:      Rate and Rhythm: Normal rate.   Pulmonary:      Effort: Pulmonary effort is normal.   Musculoskeletal:         General: Normal range of motion.      Cervical back: Normal range of motion.      Comments: Erythema, swelling, and tenderness to palpation over right 1st MTP joint. 3 pinpoint areas of dark discoloration proximal to area of symptoms.    Skin:     General: Skin is warm and dry.   Neurological:      General: No focal deficit present.      Mental Status: He is alert.   Psychiatric:         Mood and Affect: Mood normal.         Behavior: Behavior " normal.                    Signed Electronically by: Monet Cowan PA-C

## 2024-08-12 NOTE — PATIENT INSTRUCTIONS
Your symptoms are likely an inflammatory reaction related to the bee sting.  For treatment you have been prescribed prednisone, a steroid and Zyrtec.  You can discontinue use of ibuprofen and Benadryl.    We are checking uric acid levels to look for signs of gout as your symptoms are concerning for a gout flareup.  If the uric acid level is high, you are likely predisposed to gout and should follow the purine restricted diet recommended on the handout at the end of this packet.  The prescribed prednisone will also treat a gout flareup.    If your symptoms are worsening over the next few days instead of improving, please let me know right away so that we can consider an antibiotic.  Reach out with questions or concerns.